# Patient Record
Sex: FEMALE | Race: WHITE | NOT HISPANIC OR LATINO | URBAN - METROPOLITAN AREA
[De-identification: names, ages, dates, MRNs, and addresses within clinical notes are randomized per-mention and may not be internally consistent; named-entity substitution may affect disease eponyms.]

---

## 2020-08-24 ENCOUNTER — OFFICE VISIT (OUTPATIENT)
Dept: URBAN - METROPOLITAN AREA CLINIC 35 | Facility: CLINIC | Age: 40
End: 2020-08-24

## 2020-09-22 ENCOUNTER — TELEPHONE ENCOUNTER (OUTPATIENT)
Dept: URBAN - METROPOLITAN AREA CLINIC 35 | Facility: CLINIC | Age: 40
End: 2020-09-22

## 2020-09-22 ENCOUNTER — OFFICE VISIT (OUTPATIENT)
Dept: URBAN - METROPOLITAN AREA CLINIC 33 | Facility: CLINIC | Age: 40
End: 2020-09-22

## 2020-09-22 VITALS
SYSTOLIC BLOOD PRESSURE: 110 MMHG | TEMPERATURE: 96.6 F | HEIGHT: 66 IN | OXYGEN SATURATION: 96 % | BODY MASS INDEX: 25.07 KG/M2 | WEIGHT: 156 LBS | DIASTOLIC BLOOD PRESSURE: 68 MMHG | HEART RATE: 86 BPM

## 2020-09-22 RX ORDER — MESALAMINE 1.2 G/1
2 TABLETS TABLET, DELAYED RELEASE ORAL ONCE A DAY
Qty: 60 | Refills: 2 | OUTPATIENT
Start: 2020-09-22

## 2020-09-22 RX ORDER — MESALAMINE 1.2 G/1
2 TABLETS TABLET, DELAYED RELEASE ORAL DAILY
Qty: 180 TABLET | Refills: 3 | COMMUNITY
Start: 2015-10-22

## 2020-09-22 RX ORDER — BUDESONIDE AND FORMOTEROL FUMARATE DIHYDRATE 80; 4.5 UG/1; UG/1
INHALE 2 PUFFS TWICE A DAY AEROSOL RESPIRATORY (INHALATION)
Qty: 30.6 G | Refills: 1 | Status: ACTIVE | COMMUNITY

## 2020-09-22 RX ORDER — LEVOTHYROXINE SODIUM 0.17 MG/1
1 TABLET TABLET ORAL ONCE A DAY
Status: ACTIVE | COMMUNITY

## 2020-09-22 RX ORDER — CLOTRIMAZOLE AND BETAMETHASONE DIPROPIONATE 10; .5 MG/G; MG/G
1 APPLICATION TO AFFECTED AREA CREAM TOPICAL TWICE A DAY
Qty: 15 GRAM | Refills: 1 | COMMUNITY
Start: 2015-04-15

## 2020-09-22 RX ORDER — CIPROFLOXACIN HYDROCHLORIDE 250 MG/1
2 TABLETS TABLET, FILM COATED ORAL
COMMUNITY

## 2020-09-22 RX ORDER — MULTIVITAMIN
CAPSULE ORAL
Status: ACTIVE | COMMUNITY

## 2020-09-22 RX ORDER — MESALAMINE 1000 MG/1
1 SUPPOSITORY AT BEDTIME SUPPOSITORY RECTAL ONCE A DAY
COMMUNITY
Start: 2015-04-15

## 2020-09-22 RX ORDER — MESALAMINE 1000 MG/1
1 SUPPOSITORY AT BEDTIME SUPPOSITORY RECTAL ONCE A DAY
Qty: 30 | Refills: 0 | OUTPATIENT
Start: 2020-09-22

## 2020-09-22 RX ORDER — MESALAMINE 4 G/60ML
AS DIRECTED SUSPENSION RECTAL BID
Qty: 60 | Refills: 1 | COMMUNITY
Start: 2015-04-15

## 2020-09-22 RX ORDER — ALBUTEROL SULFATE 0.75 MG/3ML
SOLUTION RESPIRATORY (INHALATION) AS NEEDED
Status: ON HOLD | COMMUNITY

## 2020-09-22 NOTE — HPI-MIGRATED HPI
;   ;   ;     Change in bowel habits : Admits change in bowel habits for 3 months. Patient has 2 bowel movements a day. Stools are formed half of time and loose the other half of time. She used to have formed stools most of time.   Patient denies any associated symptoms of diarrhea, constipation, fever, chills, nausea, vomiting, heartburn, or dysphagia. ;   Rectal Bleeding : Patient is a 40-year-old  female, who presents today complaining of persistent rectal bleeding for 3 months. Cologuard from 07/14/2020 was negative. Last colonoscopy performed on 04/15/2015 with Dr. Godinez revealed proctitis, hemorrhoids. Also admits occasional pruitus ani. BRB is present on tissue and within the toilet bowl, but not enough to stain the water red. Patient has not tried anything to relieve symptoms.    Patient was placed on Lialda back in 2015, but took herself off a year later.  Patient has 2 bowel movements a day. Stools are formed half of time and loose the other half of time. Patient denies associated symptoms of melena, mucus, or blood in stool, frequent bowel movements, rectal pain, or diarrhea.     ;   Abdominal Pain : Admits lower intermittent abdominal pain for 3 months. Pain is dull and random. Also it occurs twice a week, lasting a couple of hours at time. Pain is relieved on its own. Pt is unsure of exacerbating or relieving factors.  Last colonoscopy performed on 04/15/2015 with Dr. Godinez revealed proctitis, hemorrhoids. Patient's last blood work was on 07/14/2020 without abnormal findings per patinet. She has not had any imaging for abdominal pain.   Patient denies bloating/gas, nausea, vomiting, melena, mucus, or blood in stool, diarrhea, or weight loss.;

## 2020-10-02 LAB
C-REACTIVE PROTEIN, QUANT: 1
SEDIMENTATION RATE-WESTERGREN: 2

## 2020-10-07 ENCOUNTER — TELEPHONE ENCOUNTER (OUTPATIENT)
Dept: URBAN - METROPOLITAN AREA CLINIC 35 | Facility: CLINIC | Age: 40
End: 2020-10-07

## 2020-11-24 ENCOUNTER — TELEPHONE ENCOUNTER (OUTPATIENT)
Dept: URBAN - METROPOLITAN AREA CLINIC 35 | Facility: CLINIC | Age: 40
End: 2020-11-24

## 2020-11-24 RX ORDER — MESALAMINE 1.2 G/1
2 TABLETS TABLET, DELAYED RELEASE ORAL ONCE A DAY
Qty: 60 | Refills: 2
Start: 2020-09-22

## 2021-03-25 ENCOUNTER — TELEPHONE ENCOUNTER (OUTPATIENT)
Dept: URBAN - METROPOLITAN AREA CLINIC 35 | Facility: CLINIC | Age: 41
End: 2021-03-25

## 2021-03-25 RX ORDER — MESALAMINE 1.2 G/1
2 TABLETS TABLET, DELAYED RELEASE ORAL ONCE A DAY
Qty: 60 TABLET | Refills: 0 | OUTPATIENT
Start: 2020-09-22

## 2022-01-13 ENCOUNTER — OFFICE VISIT (OUTPATIENT)
Dept: URBAN - NONMETROPOLITAN AREA CLINIC 5 | Facility: CLINIC | Age: 42
End: 2022-01-13
Payer: COMMERCIAL

## 2022-01-13 ENCOUNTER — DASHBOARD ENCOUNTERS (OUTPATIENT)
Age: 42
End: 2022-01-13

## 2022-01-13 VITALS
HEART RATE: 88 BPM | BODY MASS INDEX: 25.07 KG/M2 | WEIGHT: 156 LBS | HEIGHT: 66 IN | OXYGEN SATURATION: 98 % | SYSTOLIC BLOOD PRESSURE: 112 MMHG | DIASTOLIC BLOOD PRESSURE: 70 MMHG

## 2022-01-13 DIAGNOSIS — K51.211 ULCERATIVE (CHRONIC) PROCTITIS WITH RECTAL BLEEDING: ICD-10-CM

## 2022-01-13 PROCEDURE — 99213 OFFICE O/P EST LOW 20 MIN: CPT | Performed by: PHYSICIAN ASSISTANT

## 2022-01-13 RX ORDER — CLOTRIMAZOLE AND BETAMETHASONE DIPROPIONATE 10; .5 MG/G; MG/G
1 APPLICATION TO AFFECTED AREA CREAM TOPICAL TWICE A DAY
Qty: 15 GRAM | Refills: 1 | COMMUNITY
Start: 2015-04-15

## 2022-01-13 RX ORDER — MESALAMINE 1000 MG/1
1 SUPPOSITORY AT BEDTIME SUPPOSITORY RECTAL ONCE A DAY
Qty: 30 | Refills: 0 | Status: DISCONTINUED | COMMUNITY
Start: 2020-09-22

## 2022-01-13 RX ORDER — BUDESONIDE AND FORMOTEROL FUMARATE DIHYDRATE 80; 4.5 UG/1; UG/1
INHALE 2 PUFFS TWICE A DAY AEROSOL RESPIRATORY (INHALATION)
Qty: 30.6 G | Refills: 1 | Status: ACTIVE | COMMUNITY

## 2022-01-13 RX ORDER — ALBUTEROL SULFATE 0.75 MG/3ML
SOLUTION RESPIRATORY (INHALATION) AS NEEDED
Status: ON HOLD | COMMUNITY

## 2022-01-13 RX ORDER — MESALAMINE 4 G/60ML
AS DIRECTED SUSPENSION RECTAL BID
Qty: 60 | Refills: 1 | COMMUNITY
Start: 2015-04-15

## 2022-01-13 RX ORDER — CIPROFLOXACIN HYDROCHLORIDE 250 MG/1
2 TABLETS TABLET, FILM COATED ORAL
COMMUNITY

## 2022-01-13 RX ORDER — SODIUM PICOSULFATE, MAGNESIUM OXIDE, AND ANHYDROUS CITRIC ACID 10; 3.5; 12 MG/160ML; G/160ML; G/160ML
160 ML LIQUID ORAL
Qty: 320 MILLILITER | Refills: 0 | OUTPATIENT
Start: 2022-01-13 | End: 2022-01-14

## 2022-01-13 RX ORDER — MESALAMINE 1.2 G/1
2 TABLETS TABLET, DELAYED RELEASE ORAL ONCE A DAY
Qty: 60 TABLET | Refills: 0 | Status: ON HOLD | COMMUNITY
Start: 2020-09-22

## 2022-01-13 RX ORDER — LEVOTHYROXINE SODIUM 0.17 MG/1
1 TABLET TABLET ORAL ONCE A DAY
Status: ACTIVE | COMMUNITY

## 2022-01-13 RX ORDER — MULTIVITAMIN
CAPSULE ORAL
Status: ACTIVE | COMMUNITY

## 2022-01-13 NOTE — HPI-SURVEILLANCE COLONOSCOPY
41 year old female presents today for consultation for a surveillance colonoscopy. She has a personal history of colonic polyps and her last colonoscopy was completed on 04/15/2015 with Dr. Godinez results are noted below. She  denies a family history of colon polyps and cancers. Currently, has 1 bowel movement per day. Stools are normal without blood, mucus or melena.    Findings; Internal hemorrhoids, perianal skin tags, Erythematous mucosa proctitis.

## 2022-01-13 NOTE — HPI-CHANGE IN BOWEL HABITS
She admits she is having loose stools when she's stressed out.  Last visit 09/22/2020 Admits change in bowel habits for 3 months. Patient has 2 bowel movements a day. Stools are formed half of time and loose the other half of time. She used to have formed stools most of the time.   Patient denies any associated symptoms of diarrhea, constipation, fever, chills, nausea, vomiting, heartburn, or dysphagia.

## 2022-01-13 NOTE — HPI-RECTAL BLEEDING
She admits random episodes of rectal bleeding since last visit.   She states she will get stressed and then have diarrhea and see bleeding, BRB on tissue.  She states she has only had two episodes of diarrhea in the past year. She was previously on Canasa and Lialda, but ran out.    Last visit 09/22/2020 Patient is a 40-year-old  female, who presents today complaining of persistent rectal bleeding for 3 months. Cologuard from 07/14/2020 was negative. Last colonoscopy performed on 04/15/2015 with Dr. Godinez revealed proctitis, hemorrhoids. Also admits occasional pruritus ani. BRB is present on tissue and within the toilet bowl, but not enough to stain the water red. Patient has not tried anything to relieve symptoms.    Patient was placed on Lialda back in 2015, but took herself off a year later.  Patient has 2 bowel movements a day. Stools are formed half of time and loose the other half of time. Patient denies associated symptoms of melena, mucus, or blood in stool, frequent bowel movements, rectal pain, or diarrhea.

## 2022-01-13 NOTE — HPI-ABDOMINAL PAIN
She denies any episodes of abdominal pain since last visit.     Last visit 09/22/2020  Admits lower intermittent abdominal pain for 3 months. Pain is dull and random. Also, it occurs twice a week, lasting a couple of hours at time. Pain is relieved on its own. Pt is unsure of exacerbating or relieving factors.  Last colonoscopy performed on 04/15/2015 with Dr. Godinez revealed proctitis, hemorrhoids. Patient's last blood work was on 07/14/2020 without abnormal findings per patient. She has not had any imaging for abdominal pain.   Patient denies bloating/gas, nausea, vomiting, melena, mucus, or blood in stool, diarrhea, or weight loss.

## 2022-01-14 PROBLEM — 52231000 CHRONIC ULCERATIVE PROCTITIS: Status: ACTIVE | Noted: 2020-09-22

## 2022-01-21 ENCOUNTER — OFFICE VISIT (OUTPATIENT)
Dept: URBAN - METROPOLITAN AREA SURGERY CENTER 8 | Facility: SURGERY CENTER | Age: 42
End: 2022-01-21
Payer: COMMERCIAL

## 2022-01-21 DIAGNOSIS — K51.20 ULCERATIVE PROCTITIS: ICD-10-CM

## 2022-01-21 DIAGNOSIS — D12.5 ADENOMA OF SIGMOID COLON: ICD-10-CM

## 2022-01-21 DIAGNOSIS — K63.89 BACTERIAL OVERGROWTH SYNDROME: ICD-10-CM

## 2022-01-21 PROCEDURE — 45380 COLONOSCOPY AND BIOPSY: CPT | Performed by: INTERNAL MEDICINE

## 2022-01-21 PROCEDURE — 45385 COLONOSCOPY W/LESION REMOVAL: CPT | Performed by: INTERNAL MEDICINE

## 2022-01-21 PROCEDURE — G8907 PT DOC NO EVENTS ON DISCHARG: HCPCS | Performed by: INTERNAL MEDICINE

## 2022-02-09 ENCOUNTER — TELEPHONE ENCOUNTER (OUTPATIENT)
Dept: URBAN - METROPOLITAN AREA CLINIC 36 | Facility: CLINIC | Age: 42
End: 2022-02-09

## 2022-02-10 ENCOUNTER — OFFICE VISIT (OUTPATIENT)
Dept: URBAN - NONMETROPOLITAN AREA CLINIC 5 | Facility: CLINIC | Age: 42
End: 2022-02-10